# Patient Record
Sex: MALE | Race: OTHER | HISPANIC OR LATINO | URBAN - METROPOLITAN AREA
[De-identification: names, ages, dates, MRNs, and addresses within clinical notes are randomized per-mention and may not be internally consistent; named-entity substitution may affect disease eponyms.]

---

## 2021-09-18 ENCOUNTER — EMERGENCY (EMERGENCY)
Facility: HOSPITAL | Age: 31
LOS: 0 days | Discharge: HOME | End: 2021-09-18
Attending: EMERGENCY MEDICINE
Payer: SELF-PAY

## 2021-09-18 VITALS
SYSTOLIC BLOOD PRESSURE: 126 MMHG | TEMPERATURE: 98 F | RESPIRATION RATE: 18 BRPM | OXYGEN SATURATION: 96 % | DIASTOLIC BLOOD PRESSURE: 78 MMHG | HEART RATE: 68 BPM

## 2021-09-18 VITALS — WEIGHT: 179.9 LBS | HEIGHT: 70 IN

## 2021-09-18 DIAGNOSIS — R30.0 DYSURIA: ICD-10-CM

## 2021-09-18 LAB
APPEARANCE UR: CLEAR — SIGNIFICANT CHANGE UP
BILIRUB UR-MCNC: NEGATIVE — SIGNIFICANT CHANGE UP
COLOR SPEC: YELLOW — SIGNIFICANT CHANGE UP
DIFF PNL FLD: ABNORMAL
GLUCOSE UR QL: NEGATIVE MG/DL — SIGNIFICANT CHANGE UP
HIV 1 & 2 AB SERPL IA.RAPID: SIGNIFICANT CHANGE UP
KETONES UR-MCNC: NEGATIVE — SIGNIFICANT CHANGE UP
LEUKOCYTE ESTERASE UR-ACNC: NEGATIVE — SIGNIFICANT CHANGE UP
NITRITE UR-MCNC: NEGATIVE — SIGNIFICANT CHANGE UP
PH UR: 6 — SIGNIFICANT CHANGE UP (ref 5–8)
PROT UR-MCNC: 15 MG/DL
SP GR SPEC: 1.01 — SIGNIFICANT CHANGE UP (ref 1.01–1.02)
T PALLIDUM AB TITR SER: NEGATIVE — SIGNIFICANT CHANGE UP
UROBILINOGEN FLD QL: NEGATIVE MG/DL — SIGNIFICANT CHANGE UP

## 2021-09-18 PROCEDURE — 36415 COLL VENOUS BLD VENIPUNCTURE: CPT

## 2021-09-18 PROCEDURE — 86780 TREPONEMA PALLIDUM: CPT

## 2021-09-18 PROCEDURE — 99284 EMERGENCY DEPT VISIT MOD MDM: CPT

## 2021-09-18 PROCEDURE — 87591 N.GONORRHOEAE DNA AMP PROB: CPT

## 2021-09-18 PROCEDURE — 87491 CHLMYD TRACH DNA AMP PROBE: CPT

## 2021-09-18 PROCEDURE — 81001 URINALYSIS AUTO W/SCOPE: CPT

## 2021-09-18 PROCEDURE — 87086 URINE CULTURE/COLONY COUNT: CPT

## 2021-09-18 PROCEDURE — 86703 HIV-1/HIV-2 1 RESULT ANTBDY: CPT

## 2021-09-18 PROCEDURE — 99283 EMERGENCY DEPT VISIT LOW MDM: CPT

## 2021-09-18 RX ORDER — CEFTRIAXONE 500 MG/1
500 INJECTION, POWDER, FOR SOLUTION INTRAMUSCULAR; INTRAVENOUS ONCE
Refills: 0 | Status: COMPLETED | OUTPATIENT
Start: 2021-09-18 | End: 2021-09-18

## 2021-09-18 RX ADMIN — CEFTRIAXONE 500 MILLIGRAM(S): 500 INJECTION, POWDER, FOR SOLUTION INTRAMUSCULAR; INTRAVENOUS at 13:29

## 2021-09-18 RX ADMIN — Medication 100 MILLIGRAM(S): at 13:28

## 2021-09-18 NOTE — ED STATDOCS - OBJECTIVE STATEMENT
30 y/o male with no significant PMHx presents to the ED c/o burning with urination. Pt's girlfriend recently diagnosed with STI. Denies penile discharge, rash. No hx of STI. NKDA. No other complaints at this time.  ID#: [id number].

## 2021-09-18 NOTE — ED STATDOCS - PATIENT PORTAL LINK FT
You can access the FollowMyHealth Patient Portal offered by [facility] by registering at the following website: [email or web address]. By joining [employer]’s FollowMyHealth portal, you will also be able to view your health information using other applications (apps) compatible with our system.

## 2021-09-18 NOTE — ED STATDOCS - NSFOLLOWUPINSTRUCTIONS_ED_ALL_ED_FT
Disuria    Dysuria      La disuria es dolor o molestia al orinar. El dolor o la molestia se pueden sentir en la parte del cuerpo que transporta la orina fuera de la vejiga (uretra) o en el tejido que rodea los genitales. El dolor también se puede sentir en la aakash de la hemal, en la parte inferior del abdomen y de la aakash lumbar. Quizás tenga que orinar con frecuencia o la sensación repentina de tener que orinar (tenesmo vesical). La disuria puede afectar tanto a hombres kaz a mujeres, pari es más común en las mujeres.  La causa puede deberse a muchos problemas diferentes:  •Infección de las vías urinarias.      •Cálculos renales o en la vejiga.      •Ciertas enfermedades de transmisión sexual (ETS), kaz la clamidia.      •Deshidratación.      •Inflamación de los tejidos de la vagina.      •Uso de ciertos medicamentos.      •Uso de ciertos jabones o productos perfumados que provocan irritación.        Siga estas indicaciones en zepeda casa:    Instrucciones generales     •Controle zepeda afección para detectar cualquier cambio.      •Orine con frecuencia. Evite retener la orina abdullahi largos períodos.      •Después de defecar, las mujeres deben limpiarse desde adelante hacia atrás, usando el papel higiénico solo eyad vez.      •Orine después de mantener relaciones sexuales.      •Concurra a todas las visitas de control kaz se lo haya indicado el médico. Pablo Pena es importante.      •Si le realizaron pruebas para detectar la causa de la disuria, es zepeda responsabilidad retirar los resultados de las pruebas. Consulte al médico o pregunte en el departamento donde se realiza la prueba cuándo estarán listos los resultados.        Comida y bebida      •Bell suficiente líquido kaz para mantener la orina de color amarillo pálido.      •Evite la cafeína, el té y el alcohol. Estos productos pueden irritar la vejiga y empeorar la disuria. En los hombres, el alcohol puede irritar la próstata.      Medicamentos     •Cowan los medicamentos de venta shu y los recetados solamente kaz se lo haya indicado el médico.      •Si le recetaron un antibiótico, tómelo kaz se lo haya indicado el médico. No deje de hayley el antibiótico aunque comience a sentirse mejor.        Comuníquese con un médico si:    •Tiene fiebre.      •Siente dolor en la espalda o a los costados del cuerpo.      •Tiene náuseas o vómitos.      •Observa brady en la orina.      •Está orinando con más frecuencia que lo habitual.        Solicite ayuda de inmediato si:    •El dolor es intenso y no se michelle con los medicamentos.      •No puede comer ni beber sin vomitar.      •Se siente confundido.      •Tiene eyad frecuencia cardíaca acelerada en reposo.      •Tiene temblores o escalofríos.      •Se siente muy débil.        Resumen    •La disuria es dolor o molestia al orinar. Existen muchas afecciones que pueden causar disuria.      •Si tiene disuria, es posible que tenga que orinar con frecuencia o tenga la sensación repentina de tener que orinar (tenesmo vesical).      •Controle zepeda afección para detectar cualquier cambio. Concurra a todas las visitas de control kaz se lo haya indicado el médico.      •Asegúrese de orinar con frecuencia y beber suficiente líquido para mantener la orina de color amarillo pálido.      Esta información no tiene kaz fin reemplazar el consejo del médico. Asegúrese de hacerle al médico cualquier pregunta que tenga.      Document Revised: 03/29/2019 Document Reviewed: 12/10/2018    Elsevier Patient Education © 2021 [employer]      SIGUE A TU MÉDICO EN 7-10 DÍAS. REGRESE A LA ER PARA CUALQUIER SÍNTOMA PENDIENTE O NUEVAS PREOCUPACIONES.

## 2021-09-18 NOTE — ED STATDOCS - CLINICAL SUMMARY MEDICAL DECISION MAKING FREE TEXT BOX
signed Connie Del Toro PA-C Pt seen initially in intake by Dr Herbert.  ID signed Connie Del Toro PA-C Pt seen initially in intake by Dr Herbert.  ID [id number]  Pt here for dysuria, notes his girlfriend was recently tested and treated for an STD but he is not sure what. Genital exam unremarkable. UA grossly negative. Will treat pt presumptively for urethritis, rocephin and doxy. Pt advised to abstain from sexual activity until test results. Pt aware that cx and rest of STDs will not result today and we will call for any positive results. Pt feeling well at GA, agrees with GA and plan of care.

## 2021-09-18 NOTE — ED ADULT TRIAGE NOTE - CHIEF COMPLAINT QUOTE
Pt. to the ED C/O Painful Urination- Pt. states Girlfriend was diagnosed with STD but it is unaware of the name

## 2021-09-19 LAB
CULTURE RESULTS: SIGNIFICANT CHANGE UP
SPECIMEN SOURCE: SIGNIFICANT CHANGE UP

## 2021-09-20 LAB
C TRACH RRNA SPEC QL NAA+PROBE: SIGNIFICANT CHANGE UP
N GONORRHOEA RRNA SPEC QL NAA+PROBE: SIGNIFICANT CHANGE UP
SPECIMEN SOURCE: SIGNIFICANT CHANGE UP

## 2024-05-13 NOTE — ED ADULT NURSE NOTE - CAS TRG GEN SKIN COLOR
Normal for race Price (Do Not Change): 0.00 Instructions: This plan will send the code FBSE to the PM system.  DO NOT or CHANGE the price. Detail Level: Simple